# Patient Record
Sex: FEMALE | Race: WHITE | Employment: OTHER | URBAN - METROPOLITAN AREA
[De-identification: names, ages, dates, MRNs, and addresses within clinical notes are randomized per-mention and may not be internally consistent; named-entity substitution may affect disease eponyms.]

---

## 2022-02-19 ENCOUNTER — ANESTHESIA EVENT (OUTPATIENT)
Dept: SURGERY | Age: 72
End: 2022-02-19
Payer: MEDICARE

## 2022-02-19 ENCOUNTER — ANESTHESIA (OUTPATIENT)
Dept: SURGERY | Age: 72
End: 2022-02-19
Payer: MEDICARE

## 2022-02-19 ENCOUNTER — HOSPITAL ENCOUNTER (OUTPATIENT)
Age: 72
Setting detail: OUTPATIENT SURGERY
Discharge: HOME | End: 2022-02-19
Attending: OPHTHALMOLOGY | Admitting: OPHTHALMOLOGY
Payer: MEDICARE

## 2022-02-19 VITALS
DIASTOLIC BLOOD PRESSURE: 75 MMHG | RESPIRATION RATE: 15 BRPM | SYSTOLIC BLOOD PRESSURE: 108 MMHG | OXYGEN SATURATION: 96 % | HEIGHT: 64 IN | HEART RATE: 84 BPM | TEMPERATURE: 98.3 F | BODY MASS INDEX: 33.8 KG/M2 | WEIGHT: 198 LBS

## 2022-02-19 LAB
COVID-19 RAPID TEST, COVR: NOT DETECTED
GLUCOSE BLD STRIP.AUTO-MCNC: 231 MG/DL (ref 65–100)
GLUCOSE BLD STRIP.AUTO-MCNC: 299 MG/DL (ref 65–100)
SERVICE CMNT-IMP: ABNORMAL
SERVICE CMNT-IMP: ABNORMAL
SOURCE, COVRS: NORMAL

## 2022-02-19 PROCEDURE — 76210000020 HC REC RM PH II FIRST 0.5 HR: Performed by: OPHTHALMOLOGY

## 2022-02-19 PROCEDURE — 74011250636 HC RX REV CODE- 250/636: Performed by: ANESTHESIOLOGY

## 2022-02-19 PROCEDURE — 74011636637 HC RX REV CODE- 636/637: Performed by: ANESTHESIOLOGY

## 2022-02-19 PROCEDURE — 82962 GLUCOSE BLOOD TEST: CPT

## 2022-02-19 PROCEDURE — 87635 SARS-COV-2 COVID-19 AMP PRB: CPT

## 2022-02-19 PROCEDURE — 2709999900 HC NON-CHARGEABLE SUPPLY: Performed by: OPHTHALMOLOGY

## 2022-02-19 PROCEDURE — 74011250637 HC RX REV CODE- 250/637: Performed by: ANESTHESIOLOGY

## 2022-02-19 PROCEDURE — 76060000033 HC ANESTHESIA 1 TO 1.5 HR: Performed by: OPHTHALMOLOGY

## 2022-02-19 PROCEDURE — 77030018838 HC SOL IRR OPTH ALCN -B: Performed by: OPHTHALMOLOGY

## 2022-02-19 PROCEDURE — 76010000161 HC OR TIME 1 TO 1.5 HR INTENSV-TIER 1: Performed by: OPHTHALMOLOGY

## 2022-02-19 PROCEDURE — 74011000250 HC RX REV CODE- 250: Performed by: OPHTHALMOLOGY

## 2022-02-19 PROCEDURE — 77030033576 HC PK VITRCMY TOT PLS ALCN -F: Performed by: OPHTHALMOLOGY

## 2022-02-19 PROCEDURE — 76210000063 HC OR PH I REC FIRST 0.5 HR: Performed by: OPHTHALMOLOGY

## 2022-02-19 RX ORDER — DORZOLAMIDE HYDROCHLORIDE AND TIMOLOL MALEATE 20; 5 MG/ML; MG/ML
1 SOLUTION/ DROPS OPHTHALMIC 2 TIMES DAILY
COMMUNITY

## 2022-02-19 RX ORDER — LISINOPRIL 10 MG/1
10 TABLET ORAL DAILY
COMMUNITY
Start: 2022-01-20

## 2022-02-19 RX ORDER — HALOPERIDOL 5 MG/ML
1 INJECTION INTRAMUSCULAR
Status: DISCONTINUED | OUTPATIENT
Start: 2022-02-19 | End: 2022-02-19 | Stop reason: HOSPADM

## 2022-02-19 RX ORDER — MIDAZOLAM HYDROCHLORIDE 1 MG/ML
2 INJECTION, SOLUTION INTRAMUSCULAR; INTRAVENOUS ONCE
Status: DISCONTINUED | OUTPATIENT
Start: 2022-02-19 | End: 2022-02-19 | Stop reason: HOSPADM

## 2022-02-19 RX ORDER — ATORVASTATIN CALCIUM 80 MG/1
80 TABLET, FILM COATED ORAL DAILY
COMMUNITY
Start: 2022-01-03

## 2022-02-19 RX ORDER — METOPROLOL SUCCINATE 25 MG/1
25 TABLET, EXTENDED RELEASE ORAL DAILY
Status: DISCONTINUED | OUTPATIENT
Start: 2022-02-19 | End: 2022-02-19 | Stop reason: HOSPADM

## 2022-02-19 RX ORDER — FENTANYL CITRATE 50 UG/ML
100 INJECTION, SOLUTION INTRAMUSCULAR; INTRAVENOUS ONCE
Status: DISCONTINUED | OUTPATIENT
Start: 2022-02-19 | End: 2022-02-19 | Stop reason: HOSPADM

## 2022-02-19 RX ORDER — HYDROMORPHONE HYDROCHLORIDE 2 MG/ML
0.5 INJECTION, SOLUTION INTRAMUSCULAR; INTRAVENOUS; SUBCUTANEOUS
Status: DISCONTINUED | OUTPATIENT
Start: 2022-02-19 | End: 2022-02-19 | Stop reason: HOSPADM

## 2022-02-19 RX ORDER — PREDNISOLONE ACETATE 10 MG/ML
1 SUSPENSION/ DROPS OPHTHALMIC 4 TIMES DAILY
Qty: 5 ML | Refills: 1 | Status: SHIPPED | OUTPATIENT
Start: 2022-02-19

## 2022-02-19 RX ORDER — PREDNISOLONE ACETATE 10 MG/ML
1 SUSPENSION/ DROPS OPHTHALMIC 4 TIMES DAILY
Qty: 5 ML | Refills: 1 | Status: SHIPPED | OUTPATIENT
Start: 2022-02-19 | End: 2022-02-19 | Stop reason: SDUPTHER

## 2022-02-19 RX ORDER — VALACYCLOVIR HYDROCHLORIDE 1 G/1
TABLET, FILM COATED ORAL
COMMUNITY
Start: 2021-07-27

## 2022-02-19 RX ORDER — LORAZEPAM 0.5 MG/1
0.5 TABLET ORAL 2 TIMES DAILY
COMMUNITY
Start: 2021-10-14

## 2022-02-19 RX ORDER — SODIUM CHLORIDE, SODIUM LACTATE, POTASSIUM CHLORIDE, CALCIUM CHLORIDE 600; 310; 30; 20 MG/100ML; MG/100ML; MG/100ML; MG/100ML
75 INJECTION, SOLUTION INTRAVENOUS CONTINUOUS
Status: DISCONTINUED | OUTPATIENT
Start: 2022-02-19 | End: 2022-02-19 | Stop reason: HOSPADM

## 2022-02-19 RX ORDER — LIDOCAINE HYDROCHLORIDE 10 MG/ML
0.1 INJECTION INFILTRATION; PERINEURAL AS NEEDED
Status: DISCONTINUED | OUTPATIENT
Start: 2022-02-19 | End: 2022-02-19 | Stop reason: HOSPADM

## 2022-02-19 RX ORDER — MONTELUKAST SODIUM 10 MG/1
10 TABLET ORAL DAILY
COMMUNITY
Start: 2021-09-17

## 2022-02-19 RX ORDER — TROPICAMIDE 10 MG/ML
1 SOLUTION/ DROPS OPHTHALMIC
Status: COMPLETED | OUTPATIENT
Start: 2022-02-19 | End: 2022-02-19

## 2022-02-19 RX ORDER — NALOXONE HYDROCHLORIDE 0.4 MG/ML
0.2 INJECTION, SOLUTION INTRAMUSCULAR; INTRAVENOUS; SUBCUTANEOUS
Status: DISCONTINUED | OUTPATIENT
Start: 2022-02-19 | End: 2022-02-19 | Stop reason: HOSPADM

## 2022-02-19 RX ORDER — PHENYLEPHRINE HYDROCHLORIDE 25 MG/ML
1 SOLUTION/ DROPS OPHTHALMIC
Status: COMPLETED | OUTPATIENT
Start: 2022-02-19 | End: 2022-02-19

## 2022-02-19 RX ORDER — METOPROLOL SUCCINATE 25 MG/1
25 TABLET, EXTENDED RELEASE ORAL DAILY
Status: DISCONTINUED | OUTPATIENT
Start: 2022-02-20 | End: 2022-02-19

## 2022-02-19 RX ORDER — CYCLOPENTOLATE HYDROCHLORIDE 20 MG/ML
1 SOLUTION/ DROPS OPHTHALMIC
Status: COMPLETED | OUTPATIENT
Start: 2022-02-19 | End: 2022-02-19

## 2022-02-19 RX ORDER — OFLOXACIN 3 MG/ML
1 SOLUTION/ DROPS OPHTHALMIC 4 TIMES DAILY
Qty: 5 ML | Refills: 1 | Status: SHIPPED | OUTPATIENT
Start: 2022-02-19 | End: 2022-02-19 | Stop reason: SDUPTHER

## 2022-02-19 RX ORDER — INSULIN LISPRO 100 [IU]/ML
5 INJECTION, SOLUTION INTRAVENOUS; SUBCUTANEOUS
Status: COMPLETED | OUTPATIENT
Start: 2022-02-19 | End: 2022-02-19

## 2022-02-19 RX ORDER — OFLOXACIN 3 MG/ML
1 SOLUTION/ DROPS OPHTHALMIC 4 TIMES DAILY
Qty: 5 ML | Refills: 1 | Status: SHIPPED | OUTPATIENT
Start: 2022-02-19

## 2022-02-19 RX ORDER — MIDAZOLAM HYDROCHLORIDE 1 MG/ML
2 INJECTION, SOLUTION INTRAMUSCULAR; INTRAVENOUS
Status: DISCONTINUED | OUTPATIENT
Start: 2022-02-19 | End: 2022-02-19 | Stop reason: HOSPADM

## 2022-02-19 RX ORDER — MIDAZOLAM HYDROCHLORIDE 1 MG/ML
INJECTION, SOLUTION INTRAMUSCULAR; INTRAVENOUS AS NEEDED
Status: DISCONTINUED | OUTPATIENT
Start: 2022-02-19 | End: 2022-02-19 | Stop reason: HOSPADM

## 2022-02-19 RX ORDER — METFORMIN HYDROCHLORIDE 500 MG/1
500 TABLET, EXTENDED RELEASE ORAL 2 TIMES DAILY
COMMUNITY
Start: 2021-09-17

## 2022-02-19 RX ORDER — BUPROPION HYDROCHLORIDE 150 MG/1
150 TABLET ORAL DAILY
COMMUNITY
Start: 2022-01-20

## 2022-02-19 RX ORDER — BUSPIRONE HYDROCHLORIDE 15 MG/1
15 TABLET ORAL 2 TIMES DAILY
COMMUNITY
Start: 2022-01-20 | End: 2023-01-20

## 2022-02-19 RX ORDER — ONDANSETRON 2 MG/ML
4 INJECTION INTRAMUSCULAR; INTRAVENOUS
Status: DISCONTINUED | OUTPATIENT
Start: 2022-02-19 | End: 2022-02-19 | Stop reason: HOSPADM

## 2022-02-19 RX ORDER — METOPROLOL SUCCINATE 25 MG/1
25 TABLET, EXTENDED RELEASE ORAL DAILY
COMMUNITY
Start: 2022-01-20

## 2022-02-19 RX ORDER — DULOXETIN HYDROCHLORIDE 60 MG/1
60 CAPSULE, DELAYED RELEASE ORAL 2 TIMES DAILY
COMMUNITY
Start: 2021-09-17 | End: 2022-09-17

## 2022-02-19 RX ORDER — NALOXONE HYDROCHLORIDE 0.4 MG/ML
0.2 INJECTION, SOLUTION INTRAMUSCULAR; INTRAVENOUS; SUBCUTANEOUS AS NEEDED
Status: DISCONTINUED | OUTPATIENT
Start: 2022-02-19 | End: 2022-02-19 | Stop reason: HOSPADM

## 2022-02-19 RX ORDER — SODIUM CHLORIDE, SODIUM LACTATE, POTASSIUM CHLORIDE, CALCIUM CHLORIDE 600; 310; 30; 20 MG/100ML; MG/100ML; MG/100ML; MG/100ML
25 INJECTION, SOLUTION INTRAVENOUS CONTINUOUS
Status: DISCONTINUED | OUTPATIENT
Start: 2022-02-19 | End: 2022-02-19 | Stop reason: HOSPADM

## 2022-02-19 RX ORDER — OXYCODONE HYDROCHLORIDE 5 MG/1
5 TABLET ORAL
Status: DISCONTINUED | OUTPATIENT
Start: 2022-02-19 | End: 2022-02-19 | Stop reason: HOSPADM

## 2022-02-19 RX ADMIN — INSULIN LISPRO 5 UNITS: 100 INJECTION, SOLUTION INTRAVENOUS; SUBCUTANEOUS at 13:45

## 2022-02-19 RX ADMIN — MIDAZOLAM 1 MG: 1 INJECTION INTRAMUSCULAR; INTRAVENOUS at 14:50

## 2022-02-19 RX ADMIN — CYCLOPENTOLATE HYDROCHLORIDE 1 DROP: 20 SOLUTION/ DROPS OPHTHALMIC at 13:54

## 2022-02-19 RX ADMIN — PHENYLEPHRINE HYDROCHLORIDE 1 DROP: 25 SOLUTION/ DROPS OPHTHALMIC at 14:10

## 2022-02-19 RX ADMIN — PHENYLEPHRINE HYDROCHLORIDE 1 DROP: 25 SOLUTION/ DROPS OPHTHALMIC at 13:55

## 2022-02-19 RX ADMIN — TROPICAMIDE 1 DROP: 10 SOLUTION/ DROPS OPHTHALMIC at 14:10

## 2022-02-19 RX ADMIN — TROPICAMIDE 1 DROP: 10 SOLUTION/ DROPS OPHTHALMIC at 14:00

## 2022-02-19 RX ADMIN — PHENYLEPHRINE HYDROCHLORIDE 1 DROP: 25 SOLUTION/ DROPS OPHTHALMIC at 14:01

## 2022-02-19 RX ADMIN — METOPROLOL SUCCINATE 25 MG: 25 TABLET, EXTENDED RELEASE ORAL at 14:09

## 2022-02-19 RX ADMIN — MIDAZOLAM 1 MG: 1 INJECTION INTRAMUSCULAR; INTRAVENOUS at 14:47

## 2022-02-19 RX ADMIN — CYCLOPENTOLATE HYDROCHLORIDE 1 DROP: 20 SOLUTION/ DROPS OPHTHALMIC at 14:00

## 2022-02-19 RX ADMIN — CYCLOPENTOLATE HYDROCHLORIDE 1 DROP: 20 SOLUTION/ DROPS OPHTHALMIC at 14:10

## 2022-02-19 RX ADMIN — SODIUM CHLORIDE, SODIUM LACTATE, POTASSIUM CHLORIDE, AND CALCIUM CHLORIDE 25 ML/HR: 600; 310; 30; 20 INJECTION, SOLUTION INTRAVENOUS at 13:40

## 2022-02-19 RX ADMIN — TROPICAMIDE 1 DROP: 10 SOLUTION/ DROPS OPHTHALMIC at 13:54

## 2022-02-19 NOTE — ANESTHESIA PREPROCEDURE EVALUATION
Relevant Problems   No relevant active problems       Anesthetic History   No history of anesthetic complications            Review of Systems / Medical History  Patient summary reviewed and pertinent labs reviewed    Pulmonary  Within defined limits                 Neuro/Psych   Within defined limits           Cardiovascular    Hypertension: well controlled              Exercise tolerance: >4 METS  Comments: Denies CP, SOB or changes in functional status  2020 TTE WNL     GI/Hepatic/Renal  Within defined limits              Endo/Other    Diabetes: well controlled, type 2    Obesity     Other Findings            Physical Exam    Airway  Mallampati: II  TM Distance: 4 - 6 cm  Neck ROM: normal range of motion   Mouth opening: Normal     Cardiovascular    Rhythm: regular  Rate: normal         Dental    Dentition: Caps/crowns     Pulmonary  Breath sounds clear to auscultation               Abdominal  GI exam deferred       Other Findings            Anesthetic Plan    ASA: 2  Anesthesia type: total IV anesthesia          Induction: Intravenous  Anesthetic plan and risks discussed with: Patient

## 2022-02-19 NOTE — H&P
ADMISSION HISTORY AND PHYSICAL EXAM    Date Time: 02/19/22 1:50 PM  Patient Name: Dagmar Levy   Attending Physician: Neli Dasilva MD  Assessment:    There are no problems to display for this patient. Plan:    Procedure(s):  VITRECTOMY POSTERIOR 25 GAUGE RIGHT LASER, GAS EXCHANGE, right eye    History of Present Illness:    Dagmar Levy is a 70 y.o. female who presents to the hospital with retinal detachment of  right eye. Past Medical History:    History reviewed. No pertinent past medical history. Past Surgical History:    History reviewed. No pertinent surgical history.     Allergies:    No Known Allergies    Medications:      Current Facility-Administered Medications:     lidocaine (XYLOCAINE) 10 mg/mL (1 %) injection 0.1 mL, 0.1 mL, SubCUTAneous, PRN, Ken Fuentes MD    lactated Ringers infusion, 25 mL/hr, IntraVENous, CONTINUOUS, Ken Fuentes MD    fentaNYL citrate (PF) injection 100 mcg, 100 mcg, IntraVENous, ONCE, Ken Fuentes MD    midazolam (VERSED) injection 2 mg, 2 mg, IntraVENous, ONCE PRN, Ken Fuentes MD    midazolam (VERSED) injection 2 mg, 2 mg, IntraVENous, ONCE, Ken Fuentes MD    [facility]) injection 0.2 mg, 0.2 mg, IntraVENous, Multiple, Ken Fuentes MD    ondansetron [facility]) injection 4 mg, 4 mg, IntraVENous, ONCE PRN, Ken Fuentes MD    tropicamide (mydRIACYL) 1 % ophthalmic solution 1 Drop, 1 Drop, Right Eye, Q5MIN, Minh Hernandez MD    phenylephrine (mydFRIN) 2.5 % ophthalmic solution 1 Drop, 1 Drop, Right Eye, Q5MIN, Minh Hernandez MD    cyclopentolate (CYCLOGYL) 2 % ophthalmic solution 1 Drop, 1 Drop, Right Eye, Q5MIN, MD Halima Serrano ON 2/20/2022] metoprolol succinate (TOPROL-XL) XL tablet 25 mg, 25 mg, Oral, DAILY, Ken Fuentes MD     Physical Exam:    Visit Vitals  /77   Pulse 96   Temp 97.7 °F (36.5 °C)   Resp 16   SpO2 95%       RIGHT Eye: Retinal findings: retinal detachment, severe glaucoma, visual field deficit    Neuro: Alert, Oriented, moves all extremities equally AGREE  Cardiac: Regular rate and rhythm, no murmur AGREE  Respiratory: Bilateral/equal breath sounds present AGREE  Ext: No pitting edema of LE bilaterally    Labs:    Recent Results (from the past 24 hour(s))   COVID-19 RAPID TEST    Collection Time: 02/19/22  1:26 PM   Result Value Ref Range    Specimen source NASAL      COVID-19 rapid test Not detected NOTD     GLUCOSE, POC    Collection Time: 02/19/22  1:32 PM   Result Value Ref Range    Glucose (POC) 299 (H) 65 - 100 mg/dL    Performed by Odalis         CONSENT    Operative Site Verified: RIGHT  Informed consent Obtained: Specific benefits, risks, and alternatives discussed with patient and/or next of kin or guardian:AGREE      Signed by: Lelia Benjamin MD

## 2022-02-19 NOTE — DISCHARGE INSTRUCTIONS
POST OPERATIVE INSTRUCTIONS      BRING THIS PAPER AND THE EYE KIT TO EVERY EYE APPOINTMENT AFTER SURGERY, READ THESE INSTRUCTIONS AND ASK ANY QUESTIONS AT YOUR APPOINTMENT     YOU SHOULD HAVE A FOLLOW UP APPOINTMENT SCHEDULED WITH YOUR SURGEON OR HIS COLLEAGUE. IF YOU ARE UNSURE OF THE TIME OR LOCATION OF YOUR APPOINTMENT, PLEASE CALL THE OFFICE. OFFICE LOCATIONS AND PHONE NUMBERS CAN BE FOUND ON THE LAST PAGE OF THIS DOCUMENT OR ONLINE ([email or web address])      DAY OF SURGERY/OVERNIGHT AFTER YOUR SURGERY:   Please take it easy--no heavy lifting, bending at the waist or straining.  Do NOT remove the eye patch - we will remove it at your appointment the day after surgery.  You will not need to use eye drops tonight.  Take over-the-counter pain medications (such as Tylenol, Advil, Ibuprofen) for pain if you need.  Please do not shower or get your patch wet! POSITIONING: Face down during the day x 24 hours. After 24 hours, position on LEFT side during the day for an additional 2 days. Sleep on LEFT side with your face towards the mattress, but DO NOT lie flat on your back      When positioning face-down: 50 minutes of every hour face down, followed by a 10-minute break (eat, stretch, go to the restroom, etc)    DO NOT SLEEP ON YOUR BACK WITH YOUR FACE UP    NO AIRPLANE TRAVEL FOR 60 DAYS AND UNTIL CLEARED BY SURGEON    If you are positioning face down, do NOT sit in the front seat of the car as you will be too close to the air bag with your face down. STARTING THE DAY AFTER YOUR SURGERY, FOLLOWING THE FIRST POST-OP APPOINTMENT:   Wear a hard eye shield during naps and at bedtime for 1 week (without gauze).  During the day, you do not need to cover the eye. You may wear the eye shield, glasses, or sunglasses if you want.  No rubbing the eye - dab gently around the eye with a tissue. Do not put pressure on the eyeball.    Do not drive until approved by your doctor and only do so for short distances on well-known roads.  DO NOT lift greater than 10 pounds or do any strenuous work or exercise for at least two weeks.  No baths, swimming, hot tubs, beaches, and/or saunas until approved by your doctor (usually for 1 month).  If there is a gas bubble in the eye, do NOT lie flat on your back, do NOT fly in an airplane, do NOT drive on roads at an altitude higher than 2000 feet. You may:  Walk briskly, ride stationary bicycle, do light housework as long as you are positioning properly while doing these activities. Shower and wash your face but do not let any water get in the eye (keep water below the chin and use wash-cloth to wash your face)  Use the other eye including watch TV, read, use video devices      NEXT APPOINTMENT - In about one week, please call if you do not have an appointment      [id number] if you have:     New darkening of vision or blackness        Loss of vision  Worsening pain that is not getting better with the previously mentioned pain medications  Headache that does not go away within a few hours      DO NOT WAIT UNTIL YOUR NEXT SCHEDULED APPOINTMENT        POST-OP MEDICATIONS (to begin tomorrow morning after seeing your eye surgeon in clinic):      *Wait about 5 minutes in between drops of different medications*     Prednisolone Acetate 1% or Pred Forte Drops - (Pink or White Cap - Milky Drops)    Use 1 drop FOUR TIMES A DAY (Breakfast, Lunch, Dinner, Bedtime)     Ofloxacin Antibiotic Drops -- (Tan or [employer])  Use 1 drop 4 TIMES A DAY  (Breakfast, Lunch, Dinner, Bedtime)      _____________________________________________________    [address] [facility] [address]  [facility], [address]   [phone]  Fax: [phone]    LUCIA Marques [address], [address]  ([address]nd floor [address] [place] [address])  [place]: [phone]  F: [phone]    [facility] [place]  [address]  (In [facility] building)  P: [phone]  F: [address], [address] [facility]  P: [phone]  F: [address] [facility] [address]  P: [phone]  F: [phone]                  .

## 2022-02-19 NOTE — ANESTHESIA POSTPROCEDURE EVALUATION
Procedure(s):  VITRECTOMY POSTERIOR 25 GAUGE RIGHT LASER, GAS EXCHANGE.    total IV anesthesia    Anesthesia Post Evaluation      Multimodal analgesia: multimodal analgesia used between 6 hours prior to anesthesia start to PACU discharge  Patient location during evaluation: bedside  Patient participation: complete - patient participated  Level of consciousness: awake and alert  Pain score: 0  Pain management: adequate  Airway patency: patent  Anesthetic complications: no  Cardiovascular status: acceptable  Respiratory status: acceptable  Hydration status: acceptable  Comments: Pt doing well. Ok to d/c home. Post anesthesia nausea and vomiting:  none  Final Post Anesthesia Temperature Assessment:  Normothermia (36.0-37.5 degrees C)      INITIAL Post-op Vital signs:   Vitals Value Taken Time   /69 02/19/22 1553   Temp 36.7 °C (98.1 °F) 02/19/22 1548   Pulse 88 02/19/22 1554   Resp 14 02/19/22 1548   SpO2 98 % 02/19/22 1554   Vitals shown include unvalidated device data.

## 2022-02-19 NOTE — OP NOTES
FULL OPERATIVE NOTE    Date Time: 02/19/22 @ 3:46 PM  Patient Name: Solmon Spine  Attending Physician: Gigi Alonzo MD      Date of Operation:  2/19/2022    Providers Performing:  Surgeon: Gigi Alonzo MD  Assistant: N/A    Operative Procedure:  Procedure(s):  VITRECTOMY POSTERIOR 25 GAUGE RIGHT LASER, GAS EXCHANGE    Preoperative Diagnosis:  Rhegmatogenous retinal detachment with single break(s), right eye    Postoperative Diagnosis:  Rhegmatogenous retinal detachment with single break(s), right eye    Indications:  Loss of vision    Operative Notes:    Informed consent was obtained prior the procedure. The procedure, risks, benefits, and alternatives were discussed with the patient. In the preoperative holding area the operative eye was marked and confirmed with the patient. The patient was then brought back to the operating room and a surgical safety timeout was performed. The patient was then placed under monitored anesthesia care. The operative eye was then prepped and draped in the usual sterile fashion for ophthalmic surgery. The microscope was brought into position. The patient  received a Sub-Tenon's block consisting of .75% Marcaine and 2% xylocaine through an inferonasal cutdown incision. Standard 25 gauge instrumentation was used to create sclerotomies 3.5 mm posterior to the limbus, avoiding the superotemporal glaucoma drainage implant. The infusion cannula was verified prior to turning on the infusion. Upon visualization with the light pipe, the retinal detachment extended from 7-10 with a retinal tear at 7 and the macula attached. A core vitrectomy was performed. A posterior vitreous detachment was already present. Intravitreal triesence was instilled to better visualize the vitreous. Vitrectomy was carried out 360 degrees, shaving the vitreous base with use of scleral depression.  The retina was inspected 360-degrees and no additional retinal tears or breaks were noted. Diathermy was applied to all breaks and used create a retinotomy inferonasally away from the macula and major vasculature. An air fluid exchange was then performed draining fluid through the retinotomy. Endolaser was applied to all breaks and around the retinotomy after fluid air exchange. A gas timeout was performed, confirming the appropriate type and concentration of gas. The eye was then flushed with 40 cc of 12% C3F8 gas to a moderate tactile tension. The cannulas were pulled and the wounds were sutured with 6-0 plain gut suture, and pressure was moderate to tactile tension. A subconjunctival injection of ancef and betamethasone was given. At the end of case the IOP was physiologic and < 15 mmHg, the nerve was perfused, and the retina was attached 360 degrees. The eye was dressed with cyclogyl and erythromycin ointment. A patch was placed over the eye. The patient tolerated the procedure well. There were no complications. The patient received post operative instructions including follow up with [facility].       Estimated Blood Loss:  Less than 1 cc    Specimens:  None    Complications:  None    Signed by: Naveed Edward MD  02/19/22 3:46 PM

## (undated) DEVICE — CONSTELLATION VISION SYSTEM 7500 CPM ULTRAVIT PROBE STRAIGHT ENDOILLUMINATOR 25+ TOTALPLUS VITRECTOMY PAK EDGEPLUS BLADE VALVED ENTRY SYSTEM: Brand: CONSTELLATION, ULTRAVIT, 25+, TOTALPLUS, EDGEPLUS

## (undated) DEVICE — SURGICAL PROCEDURE PACK EYE CDS

## (undated) DEVICE — SOLUTION IRRIGATION BAL SALT SOLUTION 500 ML BTL 6/CA BSS +

## (undated) DEVICE — COVER,MAYO STAND,STERILE: Brand: MEDLINE

## (undated) DEVICE — 3M™ STERI-DRAPE™ INSTRUMENT POUCH 1018: Brand: STERI-DRAPE™

## (undated) DEVICE — 3M™ STERI-DRAPE™ MEDIUM DRAPE WITH INCISE FILM AND POUCH 1061: Brand: STERI-DRAPE™

## (undated) DEVICE — 3M™ TEGADERM™ TRANSPARENT FILM DRESSING FRAME STYLE, 1626W, 4 IN X 4-3/4 IN (10 CM X 12 CM), 50/CT 4CT/CASE: Brand: 3M™ TEGADERM™

## (undated) DEVICE — STRIP,CLOSURE,WOUND,MEDI-STRIP,1/2X4: Brand: MEDLINE

## (undated) DEVICE — SYR 5ML 1/5 GRAD LL NSAF LF --

## (undated) DEVICE — BETADINE 5% EYE SOL